# Patient Record
Sex: MALE | Race: OTHER | HISPANIC OR LATINO | URBAN - METROPOLITAN AREA
[De-identification: names, ages, dates, MRNs, and addresses within clinical notes are randomized per-mention and may not be internally consistent; named-entity substitution may affect disease eponyms.]

---

## 2020-03-02 ENCOUNTER — EMERGENCY (EMERGENCY)
Facility: HOSPITAL | Age: 50
LOS: 1 days | Discharge: HOME | End: 2020-03-02
Attending: EMERGENCY MEDICINE
Payer: COMMERCIAL

## 2020-03-02 VITALS
DIASTOLIC BLOOD PRESSURE: 72 MMHG | HEART RATE: 68 BPM | RESPIRATION RATE: 16 BRPM | SYSTOLIC BLOOD PRESSURE: 102 MMHG | OXYGEN SATURATION: 97 % | TEMPERATURE: 98 F

## 2020-03-02 VITALS
HEART RATE: 77 BPM | SYSTOLIC BLOOD PRESSURE: 127 MMHG | DIASTOLIC BLOOD PRESSURE: 80 MMHG | TEMPERATURE: 98 F | OXYGEN SATURATION: 95 % | HEIGHT: 61 IN | RESPIRATION RATE: 18 BRPM | WEIGHT: 160.06 LBS

## 2020-03-02 PROCEDURE — T1013: CPT

## 2020-03-02 PROCEDURE — 96374 THER/PROPH/DIAG INJ IV PUSH: CPT

## 2020-03-02 PROCEDURE — 99284 EMERGENCY DEPT VISIT MOD MDM: CPT | Mod: 25

## 2020-03-02 PROCEDURE — 70450 CT HEAD/BRAIN W/O DYE: CPT | Mod: 26

## 2020-03-02 PROCEDURE — 70450 CT HEAD/BRAIN W/O DYE: CPT

## 2020-03-02 PROCEDURE — 99284 EMERGENCY DEPT VISIT MOD MDM: CPT

## 2020-03-02 RX ORDER — METOCLOPRAMIDE HCL 10 MG
10 TABLET ORAL ONCE
Refills: 0 | Status: COMPLETED | OUTPATIENT
Start: 2020-03-02 | End: 2020-03-02

## 2020-03-02 RX ORDER — SODIUM CHLORIDE 9 MG/ML
1000 INJECTION, SOLUTION INTRAVENOUS ONCE
Refills: 0 | Status: COMPLETED | OUTPATIENT
Start: 2020-03-02 | End: 2020-03-02

## 2020-03-02 RX ADMIN — Medication 10 MILLIGRAM(S): at 07:47

## 2020-03-02 RX ADMIN — SODIUM CHLORIDE 3000 MILLILITER(S): 9 INJECTION, SOLUTION INTRAVENOUS at 07:43

## 2020-03-02 NOTE — ED ADULT TRIAGE NOTE - CHIEF COMPLAINT QUOTE
"I have a lot of headache and fever".  Pt. complaining of headache and chills since 2300 last night.  Pt. denies dizziness/lightheadedness/changes in vision/n/v/d/chest pain/sob.  Pt. states he has had headaches in the past "but not this bad".  Pt. states headache starts posteriorly and travels to frontal area.  Pt. denies trauma/injury.  Pt. did not self medicate pta

## 2020-03-02 NOTE — ED STATDOCS - PHYSICAL EXAMINATION
Head: atraumatic, normacephalic  Face: atraumatic,  eyes: perrla eomi  heart: rrr s1s2  lungs: ctab  abd: soft, nt nd +bs no rebound/guarding no cva ttp  skin: warm  LE: no swelling, no calf ttp  back: no midline cervical/thoracic/lumbar tenderness to palpation  neuro: no focal neuro deficit, CN 2-12 intact, finger to nose normal, neck supple Head: atraumatic, normacephalic  Face: atraumatic  eyes: perrla eomi  heart: rrr s1s2  lungs: ctab  abd: soft, nt nd +bs no rebound/guarding no cva ttp  skin: warm  LE: no swelling, no calf ttp  back: no midline cervical/thoracic/lumbar tenderness to palpation  neuro: no focal neuro deficit, CN 3-12 intact, finger to nose normal, neck supple

## 2020-03-02 NOTE — ED STATDOCS - NSFOLLOWUPINSTRUCTIONS_ED_ALL_ED_FT
-FOLLOW UP WITH NEUROLOGIST     Headache    A headache is pain or discomfort felt around the head or neck area. The specific cause of a headache may not be found as there are many types including tension headaches, migraine headaches, and cluster headaches. Watch your condition for any changes. Things you can do to manage your pain include taking over the counter and prescription medications as instructed by your health care provider, lying down in a dark quiet room, limiting stress, getting regular sleep, and refraining from alcohol and tobacco products.    SEEK IMMEDIATE MEDICAL CARE IF YOU HAVE ANY OF THE FOLLOWING SYMPTOMS: fever, vomiting, stiff neck, loss of vision, problems with speech, muscle weakness, loss of balance, trouble walking, passing out, or confusion.

## 2020-03-02 NOTE — ED STATDOCS - ATTENDING CONTRIBUTION TO CARE
I, Faiza Hui, performed the initial face to face bedside interview with this patient regarding history of present illness, review of symptoms and relevant past medical, social and family history.  I completed an independent physical examination.  I was the initial provider who evaluated this patient. I have signed out the follow up of any pending tests (i.e. labs, radiological studies) to the ACP.  I have communicated the patient’s plan of care and disposition with the ACP.

## 2020-03-02 NOTE — ED STATDOCS - CARE PROVIDER_API CALL
Toney Flores)  Neurology  [address] [place] [address]  Phone: [phone]  Fax: [phone]  Follow Up Time:

## 2020-03-02 NOTE — ED ADULT NURSE NOTE - OBJECTIVE STATEMENT
49 year ol.d male in no acute distress, alert and oriented x 3 c/o headache onset last night from occipital area radiates forward to face, denies fever, denies injury, denies recent travel, denies sick contacts, FROM to neck, no nuchal rigidity, denies photophobia, denies numbness and tingling, no facial droop noted.

## 2020-03-02 NOTE — ED STATDOCS - PATIENT PORTAL LINK FT
You can access the FollowMyHealth Patient Portal offered by [facility] by registering at the following website: [email or web address]. By joining [employer]’s FollowMyHealth portal, you will also be able to view your health information using other applications (apps) compatible with our system.

## 2020-03-02 NOTE — ED ADULT NURSE NOTE - CAS DISCH BELONGINGS RETURNED
Detail Level: Detailed
Quality 111:Pneumonia Vaccination Status For Older Adults: Pneumococcal Vaccination Previously Received
Quality 110: Preventive Care And Screening: Influenza Immunization: Influenza Immunization Administered during Influenza season
Quality 130: Documentation Of Current Medications In The Medical Record: Current Medications Documented
Yes

## 2020-03-02 NOTE — ED STATDOCS - CLINICAL SUMMARY MEDICAL DECISION MAKING FREE TEXT BOX
Most likely tension HA, pt not febrile on oral temp will recheck rectal temp, ct head as regularly does not get headaches. Most likely tension HA, pt not febrile on oral temp will recheck rectal temp, ct head as regularly does not get headaches.--pain control --reassess

## 2020-03-02 NOTE — ED STATDOCS - PROGRESS NOTE DETAILS
49 year old male p/w posterior HA. Denies, fever, chills, visual changes, vomiting. HPI/ ROS/ PEx as stated above. CT head, IVF and Reglan ordered. Pt reassessed and is currently feeling better. Will d/c with neuro for f/u. Return precautions provided.

## 2020-03-02 NOTE — ED STATDOCS - OBJECTIVE STATEMENT
48 y/o M with no PMHx presents to the ED c/o worsening HA onset x8 hours ago. Pt states that yesterday while laying down, he started having HA which then worsened over the next x3 hours. Pt reports feeling generally weak and having chills. Pt states he had similar symptoms but they used to be relieved with Tylenol. Pt states that symptoms worse with head movement.   Denies f/c/n/v/cp/sob/palpitations/ cough/rash/dizziness/abd.pain/d/c/dysuria/hematuria/ photophobia/ recent sickness/ recent travel/neck stiffness 48 y/o M with no PMHx presents to the ED c/o worsening HA onset x8 hours ago. Pt states that yesterday while laying down around 11pm, he started having HA  gradual onset but got worse around 2am. Pt reports feeling generally weak and having chills. Pt states he had similar symptoms a month ago but they used to be relieved with Tylenol. notes that didn't take anything for pain today or last night. no trauma. notes ha worse when moves his head a lot.   Denies f/c/n/v/cp/sob/palpitations/ cough/rash/dizziness/abd.pain/d/c/dysuria/hematuria/ photophobia/ blurry vision/ recent sickness/ recent travel/neck stiffness

## 2022-07-20 ENCOUNTER — OFFICE (OUTPATIENT)
Dept: URBAN - METROPOLITAN AREA CLINIC 115 | Facility: CLINIC | Age: 52
Setting detail: OPHTHALMOLOGY
End: 2022-07-20
Payer: COMMERCIAL

## 2022-07-20 DIAGNOSIS — H43.393: ICD-10-CM

## 2022-07-20 DIAGNOSIS — E11.9: ICD-10-CM

## 2022-07-20 PROCEDURE — 92004 COMPRE OPH EXAM NEW PT 1/>: CPT | Performed by: OPTOMETRIST

## 2022-07-20 PROCEDURE — 92250 FUNDUS PHOTOGRAPHY W/I&R: CPT | Performed by: OPTOMETRIST

## 2022-07-20 ASSESSMENT — TONOMETRY
OS_IOP_MMHG: 14
OD_IOP_MMHG: 16

## 2022-07-20 ASSESSMENT — VISUAL ACUITY
OS_BCVA: 20/20
OD_BCVA: 20/20

## 2022-07-20 ASSESSMENT — SPHEQUIV_DERIVED
OD_SPHEQUIV: -0.125
OS_SPHEQUIV: 0.375

## 2022-07-20 ASSESSMENT — REFRACTION_AUTOREFRACTION
OS_SPHERE: +0.50
OS_CYLINDER: -0.25
OD_AXIS: 173
OS_AXIS: 165
OD_SPHERE: +0.25
OD_CYLINDER: -0.75

## 2024-07-31 PROBLEM — Z00.00 ENCOUNTER FOR PREVENTIVE HEALTH EXAMINATION: Status: ACTIVE | Noted: 2024-07-31

## 2024-11-18 ENCOUNTER — APPOINTMENT (OUTPATIENT)
Dept: NEUROLOGY | Facility: CLINIC | Age: 54
End: 2024-11-18